# Patient Record
Sex: MALE | Race: WHITE
[De-identification: names, ages, dates, MRNs, and addresses within clinical notes are randomized per-mention and may not be internally consistent; named-entity substitution may affect disease eponyms.]

---

## 2018-10-18 ENCOUNTER — HOSPITAL ENCOUNTER (OUTPATIENT)
Dept: HOSPITAL 61 - PCVCCLINIC | Age: 63
Discharge: HOME | End: 2018-10-18
Attending: INTERNAL MEDICINE
Payer: MEDICARE

## 2018-10-18 DIAGNOSIS — Z87.891: ICD-10-CM

## 2018-10-18 DIAGNOSIS — I10: ICD-10-CM

## 2018-10-18 DIAGNOSIS — I25.2: Primary | ICD-10-CM

## 2018-10-18 DIAGNOSIS — Z79.82: ICD-10-CM

## 2018-10-18 DIAGNOSIS — R06.09: ICD-10-CM

## 2018-10-18 DIAGNOSIS — I34.0: ICD-10-CM

## 2018-10-18 DIAGNOSIS — E78.5: ICD-10-CM

## 2018-10-18 PROCEDURE — G0463 HOSPITAL OUTPT CLINIC VISIT: HCPCS

## 2018-10-18 PROCEDURE — 93005 ELECTROCARDIOGRAM TRACING: CPT

## 2018-10-23 ENCOUNTER — HOSPITAL ENCOUNTER (OUTPATIENT)
Dept: HOSPITAL 61 - PCVCIMAG | Age: 63
Discharge: HOME | End: 2018-10-23
Attending: INTERNAL MEDICINE
Payer: MEDICARE

## 2018-10-23 DIAGNOSIS — R06.09: ICD-10-CM

## 2018-10-23 DIAGNOSIS — I10: Primary | ICD-10-CM

## 2018-10-23 DIAGNOSIS — I21.9: ICD-10-CM

## 2018-10-23 PROCEDURE — 78452 HT MUSCLE IMAGE SPECT MULT: CPT

## 2018-10-23 PROCEDURE — 93306 TTE W/DOPPLER COMPLETE: CPT

## 2018-10-23 PROCEDURE — A9500 TC99M SESTAMIBI: HCPCS

## 2018-10-23 PROCEDURE — 93017 CV STRESS TEST TRACING ONLY: CPT

## 2018-10-23 NOTE — [facility]AG
--------------- APPROVED REPORT --------------





Imaging Protocol: Rest Tc-99m/Stress Tc-99m 1 day

Study performed:  10/23/2018 09:55:29



Indication: CAD, Pre Op, Abn EKG

Patient Location: Out-Patient

Stress Nurse: Ana Paula Salazar RN

NM Tech:Daniele Antony NMTCB



Ht: 6 ft 0 in Wt: 172 lbs BSA:  2.00 m2

HR: 72 bpm                      BP: 179/94 mmHg         BMI:  

23.3

Rhythm:  NSR



Medical History

Medical History: Age, HTN, CAD, MI, Former Smoker, 

Asthma

Medications: Losartan, Symbicort, Amlodipine, Crestor

Allergies: No known drug allergies

Exercise History: Sedentary

Physical Disabilities: Neck

Meds Held (24 hrs): Amlodipine



Resting Data

Rest SPECT myocardial perfusion imaging was performed in supine 

position 45 minutes following the intravenous injection of 11.4 mCi 

of Tc-99m Sestamibi.

Time of rest injection: 0930     

Administration Route: IV

Administration Site: Right AC



Pharmacologic Stress

Pharmacologic stress test was performed by injecting Regadenoson 0.4 

mg IV push over 10-15 seconds immediately followed by the intravenous 

injection of 32.4 mCi of Tc-99m Sestamibi.

Time of stress injection: 1040     

Administration Route: IV

Administration Site: Right AC

Gated Stress SPECT was performed 45 minutes after stress 

injection.

The images were gated to evaluate regional wall motion and calculate 

left ventricular ejection fraction. 



Stress Test Details

Stress Test:  Pharmacologic stress was paired with low level 

exercise.

  Reason for pharmacologic stress test: Cervical Spine 

Stenosis.



HRMax Heart Rate (APMHR): 155 bpm 

Resting HR:            72 bpmTarget HR (85% APMHR): 131 bpm

Max HR Achieved:  113 bpm

% of APMHR:         72

Recovery HR:            74 bpm



BP

Resting BP:  179/94 mmHg



Recovery BP:       161/95 mmHg

ECG

Resting ECG:  Sinus Rhythm

Stress ECG:     Sinus Tachycardia

Arrhythmia:    None

Recovery ECG: Sinus Rhythm



Clinical

Reason for Termination: Completed protocol

Stress Symptoms: Lightheaded

Exercise duration: 4 min 00 sec

Exercise capacity: 1.6 METs

Symptoms resolved during recovery.



Nurse Comments

Instructed patient to take amlodipine after returning home



Stress ECG Conclusion

1. Adequate response to intravenous Lexiscan

2. Inadequate heart rate for ECG diagnosis



Study Data

Post stress, the left ventricular ejection was 52%..

SSS: 0

SRS: 0

SDS: 0

TID = 1.05.



Perfusion

There is a large area of severely reduced uptake in the entire 

segment of the inferior wall which is seen on the stress images as 

well as the resting images.

This area thickens and moves normally and is most consistent with 

attenuation artifact.



Nuclear Conclusion

ECG Findings: non-diagnostic 

Clinical Findings: negative for ischemia 

Nuclear Findings: negative for ischemia 

Exercise Capacity: not assessed

Left Ventricular Function: normal 

1. Low risk study 



Interpreted by:  Kacy Leahy MD

Electronically Approved: 10/23/2018 13:07:10



&lt;Conclusion&gt;

1. Adequate response to intravenous Lexiscan

2. Inadequate heart rate for ECG diagnosis

## 2018-10-23 NOTE — [facility]AG
--------------- APPROVED REPORT --------------





Study performed:  10/23/2018 08:31:43



EXAM: Comprehensive 2D, Doppler, and color-flow 

Echocardiogram

Patient Location: Echo lab

Status:  routine



BSA:         2.00

BP:          160/100 mmHg

Rhythm: NSR



Other Information 

Study Quality: Good



Risk Factors: 

Cardiac Risk Factors:  HTN, Hyperlipidemia



Indications

Dyspnea 

Fatigue 

History of myocardial infarction



2D Dimensions

IVSd:  10.30 (7-11mm)LVOT Diam:  22.12 (18-24mm) 

LVDd:  56.03 mm

PWd:  6.06 (7-11mm)Ascending Ao:  32.62 (22-36mm)

LVDs:  46.91 (25-40mm)

Left Atrium:  36.64 (27-40mm)

Aortic Root:  31.67 mm

LV Single Plane 4CH:  55.36 %

LV Single Plane 2CH:  52.63 %

Biplane EF:  53.0 %



Volumes

Left Atrial Volume (Systole)

Single Plane 4CH:  34.64 mLSingle Plane 2CH:  25.96 mL

LA ESV Index:  17.00 mL/m2



Aortic Valve

AoV Peak Olu.:  1.34 m/s

AO Peak Gr.:  7.23 mmHgLVOT Max P.22 mmHg

LVOT Max V:  1.14 m/s

ELANA Vmax: 3.26 cm2



Mitral Valve

E/A Ratio:  0.6

MV Decel. Time:  298.44 ms

MV E Max Olu.:  0.55 m/s

MV A Olu.:  0.95 m/s

IVRT:  148.79 ms



Pulmonary Valve

PV Peak Gr.:  4.38 mmHg



Pulmonary Vein

P Vein S:    0.63 m/sP Vein A:  0.40 m/s

P Vein D:   0.50 m/sP Vein A Dur.:  110.7 msec

P Vein S/D Ratio:  1.26



Left Ventricle

The left ventricle is normal size. There is normal LV segmental wall 

motion. There is normal left ventricular wall thickness. Left 

ventricular systolic function is normal. The left ventricular 

ejection fraction is within the normal range. LVEF is &gt;55%. The 

left ventricular diastolic function is normal.



Right Ventricle

The right ventricle is normal size. The right ventricular systolic 

function is normal.



Atria

The left atrium size is normal. The right atrium size is 

normal.



Aortic Valve

The aortic valve is normal in structure. Trace aortic regurgitation. 

There is no aortic valvular stenosis.



Mitral Valve

The mitral valve is normal in structure. There is no mitral valve 

regurgitation noted. No evidence of mitral valve stenosis.



Tricuspid Valve

The tricuspid valve is normal in structure. There is no tricuspid 

valve regurgitation noted.



Pulmonic Valve

The pulmonary valve is normal in structure. Trace pulmonic 

regurgitation.



Great Vessels

The aortic root is normal in size. IVC is normal in size and 

collapses &gt;50% with inspiration.



Pericardium

There is no pericardial effusion.



&lt;Conclusion&gt;

The left ventricle is normal size.

LVEF is &gt;55%.

The aortic valve is normal in structure.

Trace aortic regurgitation.

The mitral valve is normal in structure.

There is no mitral valve regurgitation noted.

The tricuspid valve is normal in structure.

There is no tricuspid valve regurgitation noted.

The pulmonary valve is normal in structure.

Trace pulmonic regurgitation.

There is no pericardial effusion.